# Patient Record
Sex: MALE | Race: WHITE | NOT HISPANIC OR LATINO | URBAN - METROPOLITAN AREA
[De-identification: names, ages, dates, MRNs, and addresses within clinical notes are randomized per-mention and may not be internally consistent; named-entity substitution may affect disease eponyms.]

---

## 2020-07-25 ENCOUNTER — TELEPHONE ENCOUNTER (OUTPATIENT)
Dept: URBAN - METROPOLITAN AREA CLINIC 13 | Facility: CLINIC | Age: 75
End: 2020-07-25

## 2020-07-26 ENCOUNTER — TELEPHONE ENCOUNTER (OUTPATIENT)
Dept: URBAN - METROPOLITAN AREA CLINIC 13 | Facility: CLINIC | Age: 75
End: 2020-07-26

## 2020-07-26 RX ORDER — GLUCOSAM/MSM/CHOND/HRB149/HYAL 500-500 MG
TAKE 1 TABLET DAILY AS DIRECTED TABLET ORAL
Refills: 0 | Status: ACTIVE | COMMUNITY
Start: 2017-07-12

## 2020-08-03 NOTE — PATIENT DISCUSSION
"- ANTERIOR RING OU, POSSIBLY RELATED TO HISTORY OF CONTACT LENS WEAR.  PATIENT STATES THAT SHE HAD ISSUES WITH ""INFECTIONS"" (ULCERS?) IN THE PAST AND HAD TO STOP WEARING HER CTLS"

## 2020-08-12 NOTE — PATIENT DISCUSSION
- NO OBVIOUS PATHOLOGY ON CAREFUL DILATED EXAM. NO OPTIC NERVE HEAD PALLOR/EDEMA, NO RETINAL HEMORRHAGES, RETINA ATTACHED, NO RETINAL PALLOR.

## 2021-02-16 ENCOUNTER — TELEPHONE ENCOUNTER (OUTPATIENT)
Dept: URBAN - METROPOLITAN AREA CLINIC 113 | Facility: CLINIC | Age: 76
End: 2021-02-16

## 2022-05-20 ENCOUNTER — NEW PATIENT (OUTPATIENT)
Dept: URBAN - METROPOLITAN AREA CLINIC 22 | Facility: CLINIC | Age: 77
End: 2022-05-20

## 2022-05-20 DIAGNOSIS — H52.03: ICD-10-CM

## 2022-05-20 DIAGNOSIS — H35.3131: ICD-10-CM

## 2022-05-20 DIAGNOSIS — H25.13: ICD-10-CM

## 2022-05-20 PROCEDURE — 92004 COMPRE OPH EXAM NEW PT 1/>: CPT

## 2022-05-20 PROCEDURE — 92015 DETERMINE REFRACTIVE STATE: CPT

## 2022-05-20 ASSESSMENT — KERATOMETRY
OD_K2POWER_DIOPTERS: 43.25
OS_K1POWER_DIOPTERS: 42.25
OS_AXISANGLE2_DEGREES: 3
OD_K1POWER_DIOPTERS: 42.50
OD_AXISANGLE2_DEGREES: 157
OS_K2POWER_DIOPTERS: 43.75
OS_AXISANGLE_DEGREES: 93
OD_AXISANGLE_DEGREES: 67

## 2022-05-20 ASSESSMENT — VISUAL ACUITY
OU_SC: J2
OD_CC: 20/50
OS_CC: 20/40-2

## 2022-05-20 ASSESSMENT — TONOMETRY
OS_IOP_MMHG: 12
OD_IOP_MMHG: 10

## 2022-05-20 NOTE — PATIENT DISCUSSION
[facility] monitoring, AREDS2 vitamins, no smoking, green leafy vegetables discussed.

## 2022-07-05 ENCOUNTER — CONSULTATION/EVALUATION (OUTPATIENT)
Dept: URBAN - METROPOLITAN AREA CLINIC 20 | Facility: CLINIC | Age: 77
End: 2022-07-05

## 2022-07-05 VITALS
WEIGHT: 172 LBS | BODY MASS INDEX: 23.3 KG/M2 | DIASTOLIC BLOOD PRESSURE: 73 MMHG | SYSTOLIC BLOOD PRESSURE: 118 MMHG | HEART RATE: 64 BPM | HEIGHT: 72 IN

## 2022-07-05 DIAGNOSIS — Z98.890: ICD-10-CM

## 2022-07-05 DIAGNOSIS — H35.3131: ICD-10-CM

## 2022-07-05 PROCEDURE — 92014 COMPRE OPH EXAM EST PT 1/>: CPT

## 2022-07-05 PROCEDURE — 92134 CPTRZ OPH DX IMG PST SGM RTA: CPT

## 2022-07-05 ASSESSMENT — KERATOMETRY
OS_AXISANGLE2_DEGREES: 11
OS_AXISANGLE2_DEGREES: 3
OD_AXISANGLE_DEGREES: 39
OS_K1POWER_DIOPTERS: 42.75
OD_AXISANGLE2_DEGREES: 139
OD_AXISANGLE2_DEGREES: 140
OS_AXISANGLE_DEGREES: 100
OD_AXISANGLE2_DEGREES: 157
OD_K2POWER_DIOPTERS: 44.00
OD_AXISANGLE_DEGREES: 67
OD_K2POWER_DIOPTERS: 43.75
OD_AXISANGLE_DEGREES: 49
OS_AXISANGLE_DEGREES: 93
OD_K1POWER_DIOPTERS: 43.00
OD_AXISANGLE2_DEGREES: 129
OS_K1POWER_DIOPTERS: 42.25
OS_K2POWER_DIOPTERS: 44.00
OD_K1POWER_DIOPTERS: 42.75
OD_K2POWER_DIOPTERS: 43.25
OS_AXISANGLE_DEGREES: 101
OS_K2POWER_DIOPTERS: 43.75
OD_K1POWER_DIOPTERS: 42.50
OS_AXISANGLE2_DEGREES: 10
OD_AXISANGLE_DEGREES: 50

## 2022-07-05 ASSESSMENT — VISUAL ACUITY
OS_SC: 20/200
OD_CC: 20/50+1
OS_CC: 20/40-2
OU_CC: 20/30-3
OU_SC: 20/100-1
OU_SC: 20/200
OD_SC: 20/100+2
OU_CC: 20/40

## 2022-07-05 ASSESSMENT — TONOMETRY
OD_IOP_MMHG: 9
OS_IOP_MMHG: 10

## 2022-07-05 NOTE — PATIENT DISCUSSION
[facility] monitoring, AREDS2 vitamins, no smoking, green leafy vegetables discussed.

## 2022-08-19 ENCOUNTER — PRE-OP/H&P (OUTPATIENT)
Dept: URBAN - METROPOLITAN AREA CLINIC 20 | Facility: CLINIC | Age: 77
End: 2022-08-19

## 2022-08-19 DIAGNOSIS — H35.3131: ICD-10-CM

## 2022-08-19 DIAGNOSIS — H25.813: ICD-10-CM

## 2022-08-19 PROCEDURE — 99211PRE PRE OP VISIT

## 2022-08-19 RX ORDER — BROMFENAC 0.76 MG/ML
1 SOLUTION/ DROPS OPHTHALMIC ONCE A DAY
End: 2022-10-06

## 2022-08-19 NOTE — PATIENT DISCUSSION
[facility] monitoring, AREDS2 vitamins, no smoking, green leafy vegetables discussed.

## 2022-08-22 ASSESSMENT — KERATOMETRY
OS_K2POWER_DIOPTERS: 43.75
OS_AXISANGLE2_DEGREES: 10
OD_K1POWER_DIOPTERS: 42.75
OS_K1POWER_DIOPTERS: 42.75
OD_AXISANGLE_DEGREES: 49
OS_K1POWER_DIOPTERS: 42.25
OD_K2POWER_DIOPTERS: 43.75
OD_K2POWER_DIOPTERS: 43.25
OD_K1POWER_DIOPTERS: 43.00
OD_K2POWER_DIOPTERS: 44.00
OD_AXISANGLE_DEGREES: 67
OD_AXISANGLE_DEGREES: 39
OS_K2POWER_DIOPTERS: 44.00
OS_AXISANGLE_DEGREES: 101
OS_AXISANGLE_DEGREES: 100
OD_AXISANGLE_DEGREES: 50
OD_AXISANGLE2_DEGREES: 129
OD_AXISANGLE2_DEGREES: 139
OS_AXISANGLE2_DEGREES: 11
OS_AXISANGLE2_DEGREES: 3
OD_AXISANGLE2_DEGREES: 140
OD_AXISANGLE2_DEGREES: 157
OS_AXISANGLE_DEGREES: 93
OD_K1POWER_DIOPTERS: 42.50

## 2022-09-12 ENCOUNTER — TECH ONLY (OUTPATIENT)
Dept: URBAN - METROPOLITAN AREA CLINIC 20 | Facility: CLINIC | Age: 77
End: 2022-09-12

## 2022-09-12 DIAGNOSIS — Z98.890: ICD-10-CM

## 2022-09-12 DIAGNOSIS — H25.813: ICD-10-CM

## 2022-09-12 PROCEDURE — 99211T TECH SERVICE

## 2022-09-12 PROCEDURE — 76514 ECHO EXAM OF EYE THICKNESS: CPT

## 2022-09-12 ASSESSMENT — KERATOMETRY
OD_AXISANGLE2_DEGREES: 140
OD_AXISANGLE_DEGREES: 39
OD_K1POWER_DIOPTERS: 43.00
OD_AXISANGLE_DEGREES: 49
OD_K1POWER_DIOPTERS: 42.50
OD_AXISANGLE2_DEGREES: 129
OD_K2POWER_DIOPTERS: 43.25
OS_AXISANGLE_DEGREES: 101
OD_K2POWER_DIOPTERS: 43.75
OS_AXISANGLE2_DEGREES: 3
OD_AXISANGLE_DEGREES: 67
OS_AXISANGLE2_DEGREES: 11
OD_AXISANGLE2_DEGREES: 157
OD_K2POWER_DIOPTERS: 44.00
OD_AXISANGLE_DEGREES: 50
OS_AXISANGLE_DEGREES: 100
OS_AXISANGLE_DEGREES: 93
OD_K1POWER_DIOPTERS: 42.75
OS_K2POWER_DIOPTERS: 43.75
OS_K2POWER_DIOPTERS: 44.00
OS_K1POWER_DIOPTERS: 42.25
OS_K1POWER_DIOPTERS: 42.75
OD_AXISANGLE2_DEGREES: 139
OS_AXISANGLE2_DEGREES: 10

## 2022-09-15 ENCOUNTER — POST-OP (OUTPATIENT)
Dept: URBAN - METROPOLITAN AREA CLINIC 20 | Facility: CLINIC | Age: 77
End: 2022-09-15

## 2022-09-15 DIAGNOSIS — Z98.41: ICD-10-CM

## 2022-09-15 PROCEDURE — 99024 POSTOP FOLLOW-UP VISIT: CPT

## 2022-09-15 ASSESSMENT — VISUAL ACUITY: OD_SC: 20/200

## 2022-09-15 ASSESSMENT — TONOMETRY
OS_IOP_MMHG: 19
OD_IOP_MMHG: 19

## 2022-09-15 NOTE — PATIENT DISCUSSION
[facility] monitoring, AREDS2 vitamins, no smoking, green leafy vegetables discussed.

## 2022-09-16 ENCOUNTER — POST-OP (OUTPATIENT)
Dept: URBAN - METROPOLITAN AREA CLINIC 22 | Facility: CLINIC | Age: 77
End: 2022-09-16

## 2022-09-16 DIAGNOSIS — Z98.41: ICD-10-CM

## 2022-09-16 PROCEDURE — 99024 POSTOP FOLLOW-UP VISIT: CPT

## 2022-09-16 ASSESSMENT — TONOMETRY
OS_IOP_MMHG: 13
OD_IOP_MMHG: 17

## 2022-09-16 ASSESSMENT — VISUAL ACUITY
OU_SC: J5
OD_SC: 20/40-1

## 2022-09-23 ENCOUNTER — POST-OP (OUTPATIENT)
Dept: URBAN - METROPOLITAN AREA CLINIC 20 | Facility: CLINIC | Age: 77
End: 2022-09-23

## 2022-09-23 DIAGNOSIS — H25.812: ICD-10-CM

## 2022-09-23 DIAGNOSIS — Z98.41: ICD-10-CM

## 2022-09-23 PROCEDURE — 99024 POSTOP FOLLOW-UP VISIT: CPT

## 2022-09-23 PROCEDURE — 92136 OPHTHALMIC BIOMETRY: CPT

## 2022-09-23 ASSESSMENT — VISUAL ACUITY
OD_SC: 20/30-2
OS_SC: J16
OS_SC: 20/200
OD_SC: J2

## 2022-09-23 ASSESSMENT — TONOMETRY
OD_IOP_MMHG: 15
OS_IOP_MMHG: 14

## 2022-09-23 NOTE — PATIENT DISCUSSION
[facility] monitoring, AREDS2 vitamins, no smoking, green leafy vegetables discussed.

## 2022-10-04 ENCOUNTER — POST-OP (OUTPATIENT)
Dept: URBAN - METROPOLITAN AREA CLINIC 20 | Facility: CLINIC | Age: 77
End: 2022-10-04

## 2022-10-04 DIAGNOSIS — Z98.42: ICD-10-CM

## 2022-10-04 DIAGNOSIS — Z98.41: ICD-10-CM

## 2022-10-04 PROCEDURE — 99024 POSTOP FOLLOW-UP VISIT: CPT

## 2022-10-04 ASSESSMENT — VISUAL ACUITY
OD_SC: 20/40+1
OS_SC: 20/25-1
OU_SC: 20/25-2
OU_SC: J1-2

## 2022-10-04 ASSESSMENT — TONOMETRY
OS_IOP_MMHG: 17
OD_IOP_MMHG: 13

## 2022-10-11 ENCOUNTER — POST-OP (OUTPATIENT)
Dept: URBAN - METROPOLITAN AREA CLINIC 20 | Facility: CLINIC | Age: 77
End: 2022-10-11

## 2022-10-11 DIAGNOSIS — Z98.42: ICD-10-CM

## 2022-10-11 DIAGNOSIS — Z98.41: ICD-10-CM

## 2022-10-11 PROCEDURE — 99024 POSTOP FOLLOW-UP VISIT: CPT

## 2022-10-11 RX ORDER — BROMFENAC 0.76 MG/ML
1 SOLUTION/ DROPS OPHTHALMIC TWICE A DAY
Start: 2022-10-11

## 2022-10-11 RX ORDER — BROMFENAC 0.76 MG/ML
1 SOLUTION/ DROPS OPHTHALMIC TWICE A DAY
Start: 2022-10-25

## 2022-10-11 ASSESSMENT — KERATOMETRY
OS_AXISANGLE_DEGREES: 104
OS_K2POWER_DIOPTERS: 43.50
OS_AXISANGLE2_DEGREES: 14
OS_K1POWER_DIOPTERS: 42.25
OD_AXISANGLE_DEGREES: 57
OD_K2POWER_DIOPTERS: 43.75
OD_AXISANGLE2_DEGREES: 147
OD_K1POWER_DIOPTERS: 43.25

## 2022-10-11 ASSESSMENT — TONOMETRY
OS_IOP_MMHG: 17
OD_IOP_MMHG: 12

## 2022-10-11 ASSESSMENT — VISUAL ACUITY
OU_SC: 20/25
OS_SC: 20/25-1
OU_SC: 20/25
OD_SC: 20/40

## 2022-10-11 NOTE — PATIENT DISCUSSION
[facility] monitoring, AREDS2 vitamins, no smoking, green leafy vegetables discussed.

## 2024-08-20 NOTE — PATIENT DISCUSSION
- NO OBVIOUS PATHOLOGY ON CAREFUL DILATED EXAM. NO OPTIC NERVE HEAD PALLOR/EDEMA, NO RETINAL HEMORRHAGES, RETINA ATTACHED, NO RETINAL PALLOR. Patient Transport Tracking       Patient transported to Back to ED via cart with portable cardiac monitor, pulse oximeter and blood pressure monitors, IVF infusing well with IV site clean, dry and intact, oxygen at 2 liters/nasal cannula      Transported by Primary RN and Secondary RN, Trauma Team .